# Patient Record
Sex: MALE | Race: WHITE | NOT HISPANIC OR LATINO | Employment: FULL TIME | URBAN - METROPOLITAN AREA
[De-identification: names, ages, dates, MRNs, and addresses within clinical notes are randomized per-mention and may not be internally consistent; named-entity substitution may affect disease eponyms.]

---

## 2023-04-05 ENCOUNTER — OFFICE VISIT (OUTPATIENT)
Dept: PRIMARY CARE | Facility: CLINIC | Age: 53
End: 2023-04-05
Payer: COMMERCIAL

## 2023-04-05 VITALS
DIASTOLIC BLOOD PRESSURE: 101 MMHG | OXYGEN SATURATION: 96 % | SYSTOLIC BLOOD PRESSURE: 186 MMHG | WEIGHT: 163.25 LBS | HEART RATE: 66 BPM | BODY MASS INDEX: 24.74 KG/M2 | HEIGHT: 68 IN

## 2023-04-05 DIAGNOSIS — R22.32 SUBCUTANEOUS NODULE OF LEFT HAND: ICD-10-CM

## 2023-04-05 DIAGNOSIS — I10 HYPERTENSION, UNSPECIFIED TYPE: Primary | ICD-10-CM

## 2023-04-05 DIAGNOSIS — R94.31 ABNORMAL EKG: ICD-10-CM

## 2023-04-05 DIAGNOSIS — Z12.5 SCREENING FOR PROSTATE CANCER: ICD-10-CM

## 2023-04-05 LAB
POC ALBUMIN /CREATININE RATIO MANUALLY ENTERED: <30 UG/MG CREAT
POC URINE ALBUMIN: 10 MG/L
POC URINE CREATININE: 200 MG/DL

## 2023-04-05 PROCEDURE — 82044 UR ALBUMIN SEMIQUANTITATIVE: CPT | Performed by: FAMILY MEDICINE

## 2023-04-05 PROCEDURE — 1036F TOBACCO NON-USER: CPT | Performed by: FAMILY MEDICINE

## 2023-04-05 PROCEDURE — 3077F SYST BP >= 140 MM HG: CPT | Performed by: FAMILY MEDICINE

## 2023-04-05 PROCEDURE — 93000 ELECTROCARDIOGRAM COMPLETE: CPT | Performed by: FAMILY MEDICINE

## 2023-04-05 PROCEDURE — 99203 OFFICE O/P NEW LOW 30 MIN: CPT | Performed by: FAMILY MEDICINE

## 2023-04-05 PROCEDURE — 3080F DIAST BP >= 90 MM HG: CPT | Performed by: FAMILY MEDICINE

## 2023-04-05 RX ORDER — BENAZEPRIL HYDROCHLORIDE 10 MG/1
10 TABLET ORAL DAILY
Qty: 30 TABLET | Refills: 2 | Status: SHIPPED | OUTPATIENT
Start: 2023-04-05 | End: 2023-05-02 | Stop reason: SDUPTHER

## 2023-04-05 ASSESSMENT — ENCOUNTER SYMPTOMS
NAUSEA: 0
UNEXPECTED WEIGHT CHANGE: 0
TROUBLE SWALLOWING: 0
DIFFICULTY URINATING: 0
VOMITING: 0
NUMBNESS: 0
WHEEZING: 0
CONFUSION: 0
WEAKNESS: 0
SHORTNESS OF BREATH: 0
DIZZINESS: 0
COUGH: 0
DIARRHEA: 0
LIGHT-HEADEDNESS: 0
FEVER: 0
BLOOD IN STOOL: 0
DYSURIA: 0
CHILLS: 0
ABDOMINAL PAIN: 0

## 2023-04-05 NOTE — PROGRESS NOTES
Subjective   Patient ID: Estevan Hernandez is a 53 y.o. male who presents for Establish Care (Pt presents as new pt , concerned of htn, no meds.BL).  HPI  C/o hypertension. Home BP cuff roughly the same. Not routinely checking his BP. Used to take Amlodipine 10 mg, seemed to cause some lightheadedness, switched to Amlodipine-Benazepril 5-20 mg. He does not recall this causing any problem.    Runs 5 miles a day for many years. FMHx grandparents unknown heart disease.    Denies any symptoms.    C/o lump in hand. Noticed first a couple weeks ago, when using a wrench. No change. Not painful.    Review of Systems   Constitutional:  Negative for chills, fever and unexpected weight change.   HENT:  Negative for ear pain and trouble swallowing.    Respiratory:  Negative for cough, shortness of breath and wheezing.    Cardiovascular:  Negative for chest pain.   Gastrointestinal:  Negative for abdominal pain, blood in stool, diarrhea, nausea and vomiting.   Genitourinary:  Negative for difficulty urinating and dysuria.   Skin:  Negative for rash.   Neurological:  Negative for dizziness, syncope, weakness, light-headedness and numbness.   Psychiatric/Behavioral:  Negative for behavioral problems and confusion.        Objective   Physical Exam  Vitals and nursing note reviewed.   Constitutional:       General: He is not in acute distress.     Appearance: He is not diaphoretic.   HENT:      Head: Normocephalic and atraumatic.   Eyes:      General: No scleral icterus.     Conjunctiva/sclera: Conjunctivae normal.   Neck:      Vascular: No carotid bruit.      Comments: Normal thyroid.  Cardiovascular:      Rate and Rhythm: Normal rate and regular rhythm.      Heart sounds: Normal heart sounds.   Pulmonary:      Effort: Pulmonary effort is normal.      Breath sounds: Normal breath sounds. No wheezing, rhonchi or rales.   Musculoskeletal:         General: Deformity (left hand subcutaneous elongated firm nodule palmar aspect)  present.      Cervical back: Normal range of motion and neck supple. No rigidity or tenderness.      Right lower leg: No edema.      Left lower leg: No edema.   Lymphadenopathy:      Cervical: No cervical adenopathy.   Skin:     General: Skin is warm and dry.   Neurological:      General: No focal deficit present.      Mental Status: He is alert. Mental status is at baseline.   Psychiatric:         Mood and Affect: Mood normal.         Thought Content: Thought content normal.         Assessment/Plan   Diagnoses and all orders for this visit:  Hypertension, unspecified type  Comments:  (Re)start Benazepril. Recheck in 1 month. Check fasting labs, and EKG.  Orders:  -     benazepril (Lotensin) 10 mg tablet; Take 1 tablet (10 mg) by mouth once daily.  -     Comprehensive Metabolic Panel; Future  -     CBC; Future  -     Lipid Panel; Future  -     TSH with reflex to Free T4 if abnormal; Future  -     Magnesium; Future  -     POCT microalbumin manually resulted  -     ECG 12 lead (Clinic Performed)  -     Transthoracic echo (TTE) complete; Future  Subcutaneous nodule of left hand  Comments:  Suspect ganglion cyst. Consult hand surgery.  Orders:  -     Referral to Orthopaedic Surgery; Future  Screening for prostate cancer  -     Prostate Specific Antigen, Screen; Future  Abnormal EKG  Comments:  Probably LVH. Check echocardiogram.  Orders:  -     Transthoracic echo (TTE) complete; Future  Other orders  -     Follow Up In Primary Care; Future

## 2023-05-02 ENCOUNTER — OFFICE VISIT (OUTPATIENT)
Dept: PRIMARY CARE | Facility: CLINIC | Age: 53
End: 2023-05-02
Payer: COMMERCIAL

## 2023-05-02 ENCOUNTER — LAB (OUTPATIENT)
Dept: LAB | Facility: LAB | Age: 53
End: 2023-05-02
Payer: COMMERCIAL

## 2023-05-02 VITALS
WEIGHT: 163.13 LBS | DIASTOLIC BLOOD PRESSURE: 85 MMHG | SYSTOLIC BLOOD PRESSURE: 164 MMHG | OXYGEN SATURATION: 96 % | HEIGHT: 68 IN | BODY MASS INDEX: 24.72 KG/M2 | HEART RATE: 57 BPM

## 2023-05-02 DIAGNOSIS — I10 HYPERTENSION, UNSPECIFIED TYPE: ICD-10-CM

## 2023-05-02 DIAGNOSIS — I10 HYPERTENSION, UNSPECIFIED TYPE: Primary | ICD-10-CM

## 2023-05-02 DIAGNOSIS — Z12.5 SCREENING FOR PROSTATE CANCER: ICD-10-CM

## 2023-05-02 LAB
ALANINE AMINOTRANSFERASE (SGPT) (U/L) IN SER/PLAS: 19 U/L (ref 10–52)
ALBUMIN (G/DL) IN SER/PLAS: 4.1 G/DL (ref 3.4–5)
ALKALINE PHOSPHATASE (U/L) IN SER/PLAS: 81 U/L (ref 33–120)
ANION GAP IN SER/PLAS: 14 MMOL/L (ref 10–20)
ASPARTATE AMINOTRANSFERASE (SGOT) (U/L) IN SER/PLAS: 23 U/L (ref 9–39)
BILIRUBIN TOTAL (MG/DL) IN SER/PLAS: 0.7 MG/DL (ref 0–1.2)
CALCIUM (MG/DL) IN SER/PLAS: 9.4 MG/DL (ref 8.6–10.3)
CARBON DIOXIDE, TOTAL (MMOL/L) IN SER/PLAS: 29 MMOL/L (ref 21–32)
CHLORIDE (MMOL/L) IN SER/PLAS: 102 MMOL/L (ref 98–107)
CHOLESTEROL (MG/DL) IN SER/PLAS: 167 MG/DL (ref 0–199)
CHOLESTEROL IN HDL (MG/DL) IN SER/PLAS: 66.2 MG/DL
CHOLESTEROL/HDL RATIO: 2.5
CREATININE (MG/DL) IN SER/PLAS: 0.78 MG/DL (ref 0.5–1.3)
ERYTHROCYTE DISTRIBUTION WIDTH (RATIO) BY AUTOMATED COUNT: 12.3 % (ref 11.5–14.5)
ERYTHROCYTE MEAN CORPUSCULAR HEMOGLOBIN CONCENTRATION (G/DL) BY AUTOMATED: 33.4 G/DL (ref 32–36)
ERYTHROCYTE MEAN CORPUSCULAR VOLUME (FL) BY AUTOMATED COUNT: 90 FL (ref 80–100)
ERYTHROCYTES (10*6/UL) IN BLOOD BY AUTOMATED COUNT: 5.19 X10E12/L (ref 4.5–5.9)
GFR MALE: >90 ML/MIN/1.73M2
GLUCOSE (MG/DL) IN SER/PLAS: 71 MG/DL (ref 74–99)
HEMATOCRIT (%) IN BLOOD BY AUTOMATED COUNT: 46.7 % (ref 41–52)
HEMOGLOBIN (G/DL) IN BLOOD: 15.6 G/DL (ref 13.5–17.5)
LDL: 86 MG/DL (ref 0–99)
LEUKOCYTES (10*3/UL) IN BLOOD BY AUTOMATED COUNT: 8.6 X10E9/L (ref 4.4–11.3)
MAGNESIUM (MG/DL) IN SER/PLAS: 2.08 MG/DL (ref 1.6–2.4)
PLATELETS (10*3/UL) IN BLOOD AUTOMATED COUNT: 278 X10E9/L (ref 150–450)
POTASSIUM (MMOL/L) IN SER/PLAS: 4.7 MMOL/L (ref 3.5–5.3)
PROSTATE SPECIFIC ANTIGEN,SCREEN: 1.02 NG/ML (ref 0–4)
PROTEIN TOTAL: 6.4 G/DL (ref 6.4–8.2)
SODIUM (MMOL/L) IN SER/PLAS: 140 MMOL/L (ref 136–145)
THYROTROPIN (MIU/L) IN SER/PLAS BY DETECTION LIMIT <= 0.05 MIU/L: 0.77 MIU/L (ref 0.44–3.98)
TRIGLYCERIDE (MG/DL) IN SER/PLAS: 75 MG/DL (ref 0–149)
UREA NITROGEN (MG/DL) IN SER/PLAS: 18 MG/DL (ref 6–23)
VLDL: 15 MG/DL (ref 0–40)

## 2023-05-02 PROCEDURE — 36415 COLL VENOUS BLD VENIPUNCTURE: CPT

## 2023-05-02 PROCEDURE — 80061 LIPID PANEL: CPT

## 2023-05-02 PROCEDURE — 80053 COMPREHEN METABOLIC PANEL: CPT

## 2023-05-02 PROCEDURE — 84153 ASSAY OF PSA TOTAL: CPT

## 2023-05-02 PROCEDURE — 85027 COMPLETE CBC AUTOMATED: CPT

## 2023-05-02 PROCEDURE — 84443 ASSAY THYROID STIM HORMONE: CPT

## 2023-05-02 PROCEDURE — 1036F TOBACCO NON-USER: CPT | Performed by: FAMILY MEDICINE

## 2023-05-02 PROCEDURE — 3077F SYST BP >= 140 MM HG: CPT | Performed by: FAMILY MEDICINE

## 2023-05-02 PROCEDURE — 99214 OFFICE O/P EST MOD 30 MIN: CPT | Performed by: FAMILY MEDICINE

## 2023-05-02 PROCEDURE — 83735 ASSAY OF MAGNESIUM: CPT

## 2023-05-02 PROCEDURE — 3079F DIAST BP 80-89 MM HG: CPT | Performed by: FAMILY MEDICINE

## 2023-05-02 RX ORDER — BENAZEPRIL HYDROCHLORIDE 20 MG/1
20 TABLET ORAL DAILY
Qty: 30 TABLET | Refills: 3 | Status: SHIPPED | OUTPATIENT
Start: 2023-05-02 | End: 2023-05-26

## 2023-05-02 ASSESSMENT — ENCOUNTER SYMPTOMS
DYSURIA: 0
NAUSEA: 0
BLOOD IN STOOL: 0
COUGH: 0
ABDOMINAL PAIN: 0
FEVER: 0
UNEXPECTED WEIGHT CHANGE: 0
LIGHT-HEADEDNESS: 0
CHILLS: 0
DIFFICULTY URINATING: 0
DIZZINESS: 0
CONFUSION: 0
WEAKNESS: 0
SHORTNESS OF BREATH: 0
DIARRHEA: 0
WHEEZING: 0
VOMITING: 0
NUMBNESS: 0
TROUBLE SWALLOWING: 0

## 2023-05-02 NOTE — PATIENT INSTRUCTIONS
Monitor your blood pressure (BP) and heart rate (HR) at home.    Please return for a follow-up appointment in 3 months, earlier if any question or concern.    For assistance with scheduling referrals or consultations, please call [phone]. For laboratory, radiology, and other tests, please call [phone] ([phone] for pediatrics). Please review prescription inserts and published information for possible adverse effects. Return after testing or consultation to review results and recommendations, if symptoms persist, change, worsen, or return, or if you have any question or concern. For non-emergencies, you may call the office. For emergencies, call 9-1-1 or go to the nearest Emergency Department. Please schedule additional appointment(s) to address concern(s) not addressed today.    In general, results will not be released or discussed over the telephone. Results of tests done through [facility] are released via  "[employer]:  [email or web address]    Until we complete our transition to the new system, additional information can be found at [email or web address] or on your Android or iOS (iPhone, iPad) device using the [employer]. bertha available free of charge in your device's bertha store.

## 2023-05-02 NOTE — ASSESSMENT & PLAN NOTE
Improved, but not < 140 SBP. Increase Benazepril. Check fasting labs. Advised also other tests, as ordered. Return in 3 months.

## 2023-05-02 NOTE — PROGRESS NOTES
"Subjective   Patient ID: Estevan Hernandez is a 53 y.o. male who presents for Hypertension (Pt presents for htn follow up states not had any testing done, but has been taking meds and monitoring at home, states low 130's to low 150's.BL).  HPI  Reports high 130s SBP to 150s/80s.    Feeling pretty good. Denies any symptoms. Tolerating Benazepril well, without side effects.    Review of Systems   Constitutional:  Negative for chills, fever and unexpected weight change.   HENT:  Negative for ear pain and trouble swallowing.    Respiratory:  Negative for cough, shortness of breath and wheezing.    Cardiovascular:  Negative for chest pain.   Gastrointestinal:  Negative for abdominal pain, blood in stool, diarrhea, nausea and vomiting.   Genitourinary:  Negative for difficulty urinating and dysuria.   Skin:  Negative for rash.   Neurological:  Negative for dizziness, syncope, weakness, light-headedness and numbness.   Psychiatric/Behavioral:  Negative for behavioral problems and confusion.          Objective   /85   Pulse 57   Ht 1.727 m (5' 8\")   Wt 74 kg (163 lb 2 oz)   SpO2 96%   BMI 24.80 kg/m²     Physical Exam  Vitals and nursing note reviewed.   Constitutional:       General: He is not in acute distress.     Appearance: He is not diaphoretic.   HENT:      Head: Normocephalic and atraumatic.   Eyes:      General: No scleral icterus.     Conjunctiva/sclera: Conjunctivae normal.   Cardiovascular:      Rate and Rhythm: Normal rate and regular rhythm.      Heart sounds: Normal heart sounds.   Pulmonary:      Effort: Pulmonary effort is normal.      Breath sounds: Normal breath sounds. No wheezing, rhonchi or rales.   Skin:     General: Skin is warm and dry.   Neurological:      General: No focal deficit present.      Mental Status: He is alert. Mental status is at baseline.   Psychiatric:         Mood and Affect: Mood normal.         Thought Content: Thought content normal.         Assessment/Plan "   Problem List Items Addressed This Visit          Circulatory    Hypertension - Primary     Improved, but not < 140 SBP. Increase Benazepril. Check fasting labs. Advised also other tests, as ordered. Return in 3 months.         Relevant Medications    benazepril (Lotensin) 20 mg tablet

## 2023-05-11 ENCOUNTER — TELEPHONE (OUTPATIENT)
Dept: PRIMARY CARE | Facility: CLINIC | Age: 53
End: 2023-05-11
Payer: COMMERCIAL

## 2023-05-11 NOTE — TELEPHONE ENCOUNTER
Result Communication    Resulted Orders   Comprehensive Metabolic Panel   Result Value Ref Range    Glucose 71 (L) 74 - 99 mg/dL    Sodium 140 136 - 145 mmol/L    Potassium 4.7 3.5 - 5.3 mmol/L    Chloride 102 98 - 107 mmol/L    Bicarbonate 29 21 - 32 mmol/L    Anion Gap 14 10 - 20 mmol/L    Urea Nitrogen 18 6 - 23 mg/dL    Creatinine 0.78 0.50 - 1.30 mg/dL    GFR MALE >90 >90 mL/min/1.73m2      Comment:       CALCULATIONS OF ESTIMATED GFR ARE PERFORMED   USING THE 2021 CKD-EPI STUDY REFIT EQUATION   WITHOUT THE RACE VARIABLE FOR THE IDMS-TRACEABLE   CREATININE METHODS.    https://jasn.asnjournals.org/content/early/2021/09/22/ASN.[email or web address]    Calcium 9.4 8.6 - 10.3 mg/dL    Albumin 4.1 3.4 - 5.0 g/dL    Alkaline Phosphatase 81 33 - 120 U/L    Total Protein 6.4 6.4 - 8.2 g/dL    AST 23 9 - 39 U/L    Total Bilirubin 0.7 0.0 - 1.2 mg/dL    ALT (SGPT) 19 10 - 52 U/L      Comment:       Patients treated with Sulfasalazine may generate    falsely decreased results for ALT.   CBC   Result Value Ref Range    WBC 8.6 4.4 - 11.3 x10E9/L    RBC 5.19 4.50 - 5.90 x10E12/L    Hemoglobin 15.6 13.5 - 17.5 g/dL    Hematocrit 46.7 41.0 - 52.0 %    MCV 90 80 - 100 fL    MCHC 33.4 32.0 - 36.0 g/dL    Platelets 278 150 - 450 x10E9/L    RDW 12.3 11.5 - 14.5 %   Lipid Panel   Result Value Ref Range    Cholesterol 167 0 - 199 mg/dL      Comment:      .      AGE      DESIRABLE   BORDERLINE HIGH   HIGH     0-19 Y     0 - 169       170 - 199     >/= 200    20-24 Y     0 - 189       190 - 224     >/= 225         >24 Y     0 - 199       200 - 239     >/= 240   **All ranges are based on fasting samples. Specific   therapeutic targets will vary based on patient-specific   cardiac risk.  .   Pediatric guidelines reference:Pediatrics 2011, 128(S5).   Adult guidelines reference: NCEP ATPIII Guidelines,     GEOVANY 2001, 258:2486-97  .   Venipuncture immediately after or during the    administration of Metamizole may lead to falsely   low results.  Testing should be performed immediately   prior to Metamizole dosing.    HDL 66.2 mg/dL      Comment:      .      AGE      VERY LOW   LOW     NORMAL    HIGH       0-19 Y       < 35   < 40     40-45     ----    20-24 Y       ----   < 40       >45     ----      >24 Y       ----   < 40     40-60      >60  .    Cholesterol/HDL Ratio 2.5       Comment:      REF VALUES  DESIRABLE  < 3.4  HIGH RISK  > 5.0    LDL 86 0 - 99 mg/dL      Comment:      .                           NEAR      BORD      AGE      DESIRABLE  OPTIMAL    HIGH     HIGH     VERY HIGH     0-19 Y     0 - 109     ---    110-129   >/= 130     ----    20-24 Y     0 - 119     ---    120-159   >/= 160     ----      >24 Y     0 -  99   100-129  130-159   160-189     >/=190  .    VLDL 15 0 - 40 mg/dL    Triglycerides 75 0 - 149 mg/dL      Comment:      .      AGE      DESIRABLE   BORDERLINE HIGH   HIGH     VERY HIGH   0 D-90 D    19 - 174         ----         ----        ----  91 D- 9 Y     0 -  74        75 -  99     >/= 100      ----    10-19 Y     0 -  89        90 - 129     >/= 130      ----    20-24 Y     0 - 114       115 - 149     >/= 150      ----         >24 Y     0 - 149       150 - 199    200- 499    >/= 500  .   Venipuncture immediately after or during the    administration of Metamizole may lead to falsely   low results. Testing should be performed immediately   prior to Metamizole dosing.   Prostate Specific Antigen, Screen   Result Value Ref Range    Prostate Specific Antigen,Screen 1.02 0.00 - 4.00 ng/mL      Comment:      The FDA requires that the method used for PSA assay be   reported to the physician. Values obtained with different   assay methods must not be used interchangeably. This test   was performed at [facility] using the Siemens  MetagollGetPrice PSA method, which is a sandwich immunoassay using   chemiluminescence for quantitation. The assay is approved  for measurement of prostate-specific antigen (PSA) in   serum and may be  used in conjunction with a digital rectal  examination in men 50 years and older as an aid in   detection of prostate cancer.   5-Alpha-reductase inhibitors (e.g. Proscar, Finasteride,   Avodart, Dutasteride and Kasey) for the treatment of BPH   have been shown to lower PSA levels by an average of 50%   after 6 months of treatment.   TSH with reflex to Free T4 if abnormal   Result Value Ref Range    TSH 0.77 0.44 - 3.98 mIU/L      Comment:       TSH testing is performed using different testing    methodology at [facility] than at other    [facility]. Direct result comparisons should    only be made within the same method.   Magnesium   Result Value Ref Range    Magnesium 2.08 1.60 - 2.40 mg/dL       12:12 PM      Results were successfully communicated with the patient and they acknowledged their understanding. Spouse was informed of lab results.BL

## 2023-05-22 ENCOUNTER — TELEPHONE (OUTPATIENT)
Dept: PRIMARY CARE | Facility: CLINIC | Age: 53
End: 2023-05-22
Payer: COMMERCIAL

## 2023-05-22 NOTE — TELEPHONE ENCOUNTER
----- Message from Jay Gregg DO sent at 5/21/2023  1:44 PM EDT -----  Please let patient know that his echocardiogram report describes trace to mild tricuspid valve regurgitation, trace mitral valve regurgitation, and a normal left ventricular ejection fraction.  Overall, fairly normal, but recommend that he have an echocardiogram repeated in 3 to 5 years.

## 2023-05-22 NOTE — TELEPHONE ENCOUNTER
Result Communication    Resulted Orders   Echocardiogram    Narrative    [facility], [address] [place] [id number]  Tel [phone] and Fax [phone]    TRANSTHORACIC ECHOCARDIOGRAM REPORT      Patient Name:     MONSTER CARLSON        Toma Physician:  [id number] Nikolas GALVEZ MD  Study Date:       5/12/2023            Referring           MAGUI MANLEY  Physician:  MRN/PID:          [id number]             PCP:  Accession/Order#: [id number]         Department          [facility]  Location:  YOB: 1970            Fellow:  Gender:           M                    Nurse:  Admit Date:                            Sonographer:        Lizzeth Kan [facility]  Admission Status: Outpatient           Additional Staff:  Height:           175.26 cm            CC Report to:  Weight:           73.94 kg             Study Type:         Echocardiogram  BSA:              1.89 m2  Blood Pressure: 164 /85 mmHg    Diagnosis/ICD: I10-Essential (primary) hypertension; R94.31-Abnormal  electrocardiogram [ECG] [EKG]  Indication:    HTN; Abnormal EKG  Procedure/CPT: Echo Complete w Full [id number]    Patient History:  Pertinent History: HTN.    Study Detail: The following Echo studies were performed: 2D, M-Mode, Doppler and  color flow.      PHYSICIAN INTERPRETATION:  Left Ventricle: The left ventricular systolic function is normal, with an estimated ejection fraction of 55-60%. The calculated ejection fraction is borderline at 53 % using the Kathleen's Bi-plane MOD calculation. There are no regional wall motion abnormalities. The left ventricular cavity size is normal. Spectral Doppler shows a normal pattern of left ventricular diastolic filling.  Left Atrium: The left atrium is normal in size.  Right Ventricle: The right ventricle is normal in size. There is normal right ventriclar wall thickness. There is normal right ventricular global systolic  function.  Right Atrium: The right atrium is normal in size.  Aortic Valve: The aortic valve appears structurally normal. The aortic valve appears tricuspid and non-restricted. There is no evidence of aortic valve regurgitation. The peak instantaneous gradient of the aortic valve is 16.3 mmHg.  Mitral Valve: The mitral valve is normal in structure. There is normal mitral valve leaflet mobility. There is trace mitral valve regurgitation.  Tricuspid Valve: The tricuspid valve is structurally normal. There is normal tricuspid valve leaflet mobility. There is trace to mild tricuspid regurgitation.  Pulmonic Valve: The pulmonic valve is structurally normal. There is trace pulmonic valve regurgitation.  Pericardium: There is no pericardial effusion noted. There is a pericardial fat pad present.  Aorta: The aortic root is normal. The Ao Sinus is 3.10 cm. The Asc Ao is 3.30 cm. There is no dilatation of the aortic arch. There is no dilatation of the ascending aorta. There is no dilatation of the aortic root.  Pulmonary Artery: The pulmonary artery is normal in size. The tricuspid regurgitant velocity is 1.77 m/s, and with an estimated right atrial pressure of 3 mmHg, the estimated pulmonary artery pressure is normal with the RVSP at 15.5 mmHg.  Systemic Veins: The inferior vena cava appears to be of normal size. There is IVC inspiratory collapse greater than 50%.      CONCLUSIONS:  1. Left ventricular systolic function is normal with a 55-60% estimated ejection fraction.  2. Trace mitral valve regurgitation.  3. Trace to mild tricuspid regurgitation visualized.    QUANTITATIVE DATA SUMMARY:  2D MEASUREMENTS:  Normal Ranges:  Ao Root d:     3.10 cm   (2.0-3.7cm)  LAs:           3.20 cm   (2.7-4.0cm)  IVSd:          1.00 cm   (0.6-1.1cm)  LVPWd:         1.00 cm   (0.6-1.1cm)  LVIDd:         4.80 cm   (3.9-5.9cm)  LVIDs:         3.70 cm  LV Mass Index: 89.9 g/m2  LV % FS        22.9 %    LA VOLUME:  Normal Ranges:  LA Vol A4C:         72.0 ml    (22+/-6mL/m2)  LA Vol A2C:        72.5 ml  LA Vol BP:         72.3 ml  LA Vol Index A4C:  38.0ml/m2  LA Vol Index A2C:  38.3 ml/m2  LA Vol Index BP:   38.2 ml/m2  LA Area A4C:       21.6 cm2  LA Area A2C:       21.7 cm2  LA Major Axis A4C: 5.5 cm  LA Major Axis A2C: 5.5 cm  LA Volume Index:   35.4 ml/m2  LA Vol A4C:        66.0 ml  LA Vol A2C:        69.0 ml    M-MODE MEASUREMENTS:  Normal Ranges:  Ao Root: 3.50 cm (2.0-3.7cm)    AORTA MEASUREMENTS:  Normal Ranges:  Ao Sinus, d: 3.10 cm (2.1-3.5cm)  Asc Ao, d:   3.30 cm (2.1-3.4cm)    LV SYSTOLIC FUNCTION BY 2D PLANIMETRY (MOD):  Normal Ranges:  EF-A4C View: 53.4 % (>=55%)  EF-A2C View: 54.5 %  EF-Biplane:  52.9 %    LV DIASTOLIC FUNCTION:  Normal Ranges:  MV Peak E:        0.74 m/s    (0.7-1.2 m/s)  MV Peak A:        0.58 m/s    (0.42-0.7 m/s)  E/A Ratio:        1.27        (1.0-2.2)  MV lateral e'     0.12 m/s  MV medial e'      0.07 m/s  MV A Dur:         161.00 msec  E/e' Ratio:       5.90        (<8.0)  PulmV Sys Gerhard:    51.10 cm/s  PulmV Demarco Gerhard:   38.50 cm/s  PulmV S/D Gerhard:    1.30  PulmV A Revs Gerhard: 26.40 cm/s  PulmV A Revs Dur: 74.00 msec    MITRAL VALVE:  Normal Ranges:  MV Vmax:    0.78 m/s (<=1.3m/s)  MV peak P.4 mmHg (<5mmHg)  MV mean P.0 mmHg (<2mmHg)  MV DT:      251 msec (150-240msec)    AORTIC VALVE:  Normal Ranges:  AoV Vmax:      2.02 m/s  (<=1.7m/s)  AoV Peak P.3 mmHg (<20mmHg)  LVOT Max Gerhard:  1.39 m/s  (<=1.1m/s)  LVOT VTI:      27.10 cm  LVOT Diameter: 2.10 cm   (1.8-2.4cm)  AoV Area,Vmax: 2.38 cm2  (2.5-4.5cm2)    RIGHT VENTRICLE:  RV 1 4.01 cm  RV 2 2.79 cm  RV 3 9.49 cm    TRICUSPID VALVE/RVSP:  Normal Ranges:  Peak TR Velocity: 1.77 m/s  Est. RA Pressure: 3 mmHg  RV Syst Pressure: 15.5 mmHg (< 30mmHg)  IVC Diam:         1.85 cm    PULMONIC VALVE:  Normal Ranges:  PV Max Gerhard: 1.0 m/s  (0.6-0.9m/s)  PV Max P.4 mmHg  PIEDV:      0.91 m/s  PADP:       6.3 mmHg    Pulmonary Veins:  PulmV A Revs Dur: 74.00  msec  PulmV A Revs Gerhard: 26.40 cm/s  PulmV Demarco Gerhard:   38.50 cm/s  PulmV S/D Gerhard:    1.30  PulmV Sys Gerhard:    51.10 cm/s      [id number] Nikolas Trujillo MD  Electronically signed on 5/13/2023 at 10:11:48 PM         Final          9:39 AM      Results were successfully communicated with the spouse and they acknowledged their understanding.

## 2023-05-24 DIAGNOSIS — I10 HYPERTENSION, UNSPECIFIED TYPE: ICD-10-CM

## 2023-05-26 RX ORDER — BENAZEPRIL HYDROCHLORIDE 20 MG/1
TABLET ORAL
Qty: 30 TABLET | Refills: 3 | Status: SHIPPED | OUTPATIENT
Start: 2023-05-26 | End: 2023-09-25 | Stop reason: SDUPTHER

## 2023-09-25 ENCOUNTER — OFFICE VISIT (OUTPATIENT)
Dept: PRIMARY CARE | Facility: CLINIC | Age: 53
End: 2023-09-25
Payer: COMMERCIAL

## 2023-09-25 VITALS
HEART RATE: 61 BPM | OXYGEN SATURATION: 97 % | BODY MASS INDEX: 22.99 KG/M2 | HEIGHT: 69 IN | DIASTOLIC BLOOD PRESSURE: 88 MMHG | SYSTOLIC BLOOD PRESSURE: 144 MMHG | WEIGHT: 155.25 LBS

## 2023-09-25 DIAGNOSIS — I10 HYPERTENSION, UNSPECIFIED TYPE: ICD-10-CM

## 2023-09-25 DIAGNOSIS — I10 HYPERTENSION, UNSPECIFIED TYPE: Primary | ICD-10-CM

## 2023-09-25 DIAGNOSIS — Z12.5 SCREENING FOR PROSTATE CANCER: ICD-10-CM

## 2023-09-25 DIAGNOSIS — E16.2 HYPOGLYCEMIA: ICD-10-CM

## 2023-09-25 PROBLEM — D17.1 LIPOMA OF TORSO: Status: ACTIVE | Noted: 2023-09-25

## 2023-09-25 PROCEDURE — 3079F DIAST BP 80-89 MM HG: CPT | Performed by: FAMILY MEDICINE

## 2023-09-25 PROCEDURE — 1036F TOBACCO NON-USER: CPT | Performed by: FAMILY MEDICINE

## 2023-09-25 PROCEDURE — 3077F SYST BP >= 140 MM HG: CPT | Performed by: FAMILY MEDICINE

## 2023-09-25 PROCEDURE — 99214 OFFICE O/P EST MOD 30 MIN: CPT | Performed by: FAMILY MEDICINE

## 2023-09-25 RX ORDER — BENAZEPRIL HYDROCHLORIDE 20 MG/1
TABLET ORAL
Qty: 30 TABLET | Refills: 3 | OUTPATIENT
Start: 2023-09-25

## 2023-09-25 RX ORDER — BENAZEPRIL HYDROCHLORIDE 20 MG/1
30 TABLET ORAL DAILY
Qty: 135 TABLET | Refills: 1 | Status: SHIPPED | OUTPATIENT
Start: 2023-09-25 | End: 2024-03-21

## 2023-09-25 RX ORDER — AMLODIPINE AND BENAZEPRIL HYDROCHLORIDE 5; 20 MG/1; MG/1
1 CAPSULE ORAL DAILY
COMMUNITY
Start: 2018-06-19 | End: 2023-09-25 | Stop reason: WASHOUT

## 2023-09-25 ASSESSMENT — ENCOUNTER SYMPTOMS
NUMBNESS: 0
COUGH: 0
UNEXPECTED WEIGHT CHANGE: 0
NAUSEA: 0
TROUBLE SWALLOWING: 0
DIZZINESS: 0
VOMITING: 0
WHEEZING: 0
DYSURIA: 0
LOSS OF SENSATION IN FEET: 0
CONFUSION: 0
DEPRESSION: 0
FEVER: 0
WEAKNESS: 0
DIFFICULTY URINATING: 0
OCCASIONAL FEELINGS OF UNSTEADINESS: 0
LIGHT-HEADEDNESS: 0
ABDOMINAL PAIN: 0
DIARRHEA: 0
SHORTNESS OF BREATH: 0
CHILLS: 0
BLOOD IN STOOL: 0

## 2023-09-25 ASSESSMENT — PATIENT HEALTH QUESTIONNAIRE - PHQ9
2. FEELING DOWN, DEPRESSED OR HOPELESS: NOT AT ALL
1. LITTLE INTEREST OR PLEASURE IN DOING THINGS: NOT AT ALL
SUM OF ALL RESPONSES TO PHQ9 QUESTIONS 1 AND 2: 0

## 2023-09-25 NOTE — PATIENT INSTRUCTIONS
Monitor your resting blood pressure (BP) and heart rate (HR) at home. Goal < 140/90, but < 130/80 may be preferable in the long run.    Please return for a follow-up appointment and Wellness visit in 6 months, after labs, earlier if any question or concern, or BP not at goal.      For assistance with scheduling referrals or consultations, please call [phone]. For laboratory, radiology, and other tests, please call [phone] ([phone] for pediatrics). Please review prescription inserts and published information for possible adverse effects of all medications. Return after testing or consultation to review results and recommendations, if symptoms persist, change, worsen, or return, or if you have any question or concern. If you do not get results within 7-10 days, or you have any question or concern, please send a message, call the office ([phone]), or return to the office for a follow-up appointment. For non-emergencies, you may call the office. For emergencies, call 9-1-1 or go to the nearest Emergency Department. Please schedule additional appointment(s) to address concern(s) not addressed today.    In general, results are not released or discussed over the telephone. Results of tests done through [facility] are released via  [employer] (see below).  [email or web address]   [employer] support line: [phone]    Until we complete our transition to the new system, additional information can be found at [email or web address] or on your Android or iOS (iPhone, iPad) device using the [employer] bertha available free of charge in your device's bertha store.

## 2023-09-25 NOTE — PROGRESS NOTES
"Subjective   Patient ID: Estevan Hernandez is a 53 y.o. male who presents for Follow-up (Pt presents for check up, no concerns, refills are needed.BL).  HPI    Home BP 130s/80s. Feeling pretty good. Reports he did compare his BP cuff here, and was pretty much spot on.    Denies ever feeling weak, shaky, faint, dizzy, lightheaded, even when fasting.      Review of Systems   Constitutional:  Negative for chills, fever and unexpected weight change.   HENT:  Negative for ear pain and trouble swallowing.    Respiratory:  Negative for cough, shortness of breath and wheezing.    Cardiovascular:  Negative for chest pain.   Gastrointestinal:  Negative for abdominal pain, blood in stool, diarrhea, nausea and vomiting.   Genitourinary:  Negative for difficulty urinating and dysuria.   Skin:  Negative for rash.   Neurological:  Negative for dizziness, syncope, weakness, light-headedness and numbness.   Psychiatric/Behavioral:  Negative for behavioral problems and confusion.          Objective   /88   Pulse 61   Ht 1.74 m (5' 8.5\")   Wt 70.4 kg (155 lb 4 oz)   SpO2 97%   BMI 23.26 kg/m²     Physical Exam  Vitals and nursing note reviewed.   Constitutional:       General: He is not in acute distress.     Appearance: Normal appearance. He is not diaphoretic.   HENT:      Head: Normocephalic and atraumatic.   Eyes:      General: No scleral icterus.     Extraocular Movements: Extraocular movements intact.      Conjunctiva/sclera: Conjunctivae normal.   Cardiovascular:      Rate and Rhythm: Normal rate and regular rhythm.      Heart sounds: Normal heart sounds.   Pulmonary:      Effort: Pulmonary effort is normal. No respiratory distress.      Breath sounds: Normal breath sounds.   Abdominal:      General: Bowel sounds are normal. There is no distension.      Palpations: Abdomen is soft. There is no mass.      Tenderness: There is no abdominal tenderness. There is no guarding or rebound.   Musculoskeletal:      Right " lower leg: No edema.      Left lower leg: No edema.   Skin:     General: Skin is warm and dry.      Coloration: Skin is not jaundiced.   Neurological:      General: No focal deficit present.      Mental Status: He is alert and oriented to person, place, and time. Mental status is at baseline.   Psychiatric:         Mood and Affect: Mood normal.         Thought Content: Thought content normal.         Assessment/Plan   Problem List Items Addressed This Visit       Hypertension - Primary     Borderline control. Increase Benazepril to 30 mg. Monitor.          Relevant Medications    benazepril (Lotensin) 20 mg tablet    Other Relevant Orders    CBC    Comprehensive Metabolic Panel    Lipid Panel    Hypoglycemia     Very slight, and asymptomatic. Likely artifactual. Recheck with future routine labs.          Other Visit Diagnoses       Screening for prostate cancer        Relevant Orders    Prostate Specific Antigen, Screen        Declines flu shot.

## 2024-03-17 DIAGNOSIS — I10 HYPERTENSION, UNSPECIFIED TYPE: ICD-10-CM

## 2024-03-21 RX ORDER — BENAZEPRIL HYDROCHLORIDE 20 MG/1
30 TABLET ORAL DAILY
Qty: 45 TABLET | Refills: 0 | Status: SHIPPED | OUTPATIENT
Start: 2024-03-21 | End: 2024-04-08

## 2024-04-04 DIAGNOSIS — I10 HYPERTENSION, UNSPECIFIED TYPE: ICD-10-CM

## 2024-04-08 RX ORDER — BENAZEPRIL HYDROCHLORIDE 20 MG/1
30 TABLET ORAL DAILY
Qty: 135 TABLET | Refills: 1 | Status: SHIPPED | OUTPATIENT
Start: 2024-04-08

## 2024-07-23 ENCOUNTER — APPOINTMENT (OUTPATIENT)
Dept: PRIMARY CARE | Facility: CLINIC | Age: 54
End: 2024-07-23
Payer: COMMERCIAL

## 2024-07-23 VITALS
HEIGHT: 69 IN | SYSTOLIC BLOOD PRESSURE: 133 MMHG | HEART RATE: 52 BPM | WEIGHT: 159.13 LBS | BODY MASS INDEX: 23.57 KG/M2 | OXYGEN SATURATION: 96 % | DIASTOLIC BLOOD PRESSURE: 84 MMHG

## 2024-07-23 DIAGNOSIS — I10 HYPERTENSION, UNSPECIFIED TYPE: ICD-10-CM

## 2024-07-23 DIAGNOSIS — Z12.5 SCREENING FOR PROSTATE CANCER: Primary | ICD-10-CM

## 2024-07-23 PROCEDURE — 1036F TOBACCO NON-USER: CPT | Performed by: FAMILY MEDICINE

## 2024-07-23 PROCEDURE — 3075F SYST BP GE 130 - 139MM HG: CPT | Performed by: FAMILY MEDICINE

## 2024-07-23 PROCEDURE — 99213 OFFICE O/P EST LOW 20 MIN: CPT | Performed by: FAMILY MEDICINE

## 2024-07-23 PROCEDURE — 3079F DIAST BP 80-89 MM HG: CPT | Performed by: FAMILY MEDICINE

## 2024-07-23 PROCEDURE — 3008F BODY MASS INDEX DOCD: CPT | Performed by: FAMILY MEDICINE

## 2024-07-23 RX ORDER — BENAZEPRIL HYDROCHLORIDE 40 MG/1
40 TABLET ORAL DAILY
Qty: 100 TABLET | Refills: 1 | Status: SHIPPED | OUTPATIENT
Start: 2024-07-23

## 2024-07-23 ASSESSMENT — ENCOUNTER SYMPTOMS
CHILLS: 0
APNEA: 0
DEPRESSION: 0
DIAPHORESIS: 0
HEADACHES: 0
WHEEZING: 0
DIZZINESS: 0
LOSS OF SENSATION IN FEET: 0
COUGH: 0
SHORTNESS OF BREATH: 0
CHOKING: 0
LIGHT-HEADEDNESS: 0
PALPITATIONS: 0
UNEXPECTED WEIGHT CHANGE: 0
FEVER: 0
OCCASIONAL FEELINGS OF UNSTEADINESS: 0
CHEST TIGHTNESS: 0

## 2024-07-23 ASSESSMENT — PATIENT HEALTH QUESTIONNAIRE - PHQ9
SUM OF ALL RESPONSES TO PHQ9 QUESTIONS 1 AND 2: 0
1. LITTLE INTEREST OR PLEASURE IN DOING THINGS: NOT AT ALL
2. FEELING DOWN, DEPRESSED OR HOPELESS: NOT AT ALL

## 2024-07-23 NOTE — PROGRESS NOTES
"Subjective   Patient ID: Estevan Hernandez is a 54 y.o. male who presents for Hypertension (Pt presents fro htn check up, no concerns, rx's needed. Pt is requesting a 20mg and a 10mg of benazapril. BL).  HPI Historian(s): Self    Generally feeling well.    Reports BP at home 130s/80s.    Review of Systems   Constitutional:  Negative for chills, diaphoresis, fever and unexpected weight change.   Eyes:  Negative for visual disturbance.   Respiratory:  Negative for apnea (no PND), cough, choking, chest tightness, shortness of breath and wheezing.    Cardiovascular:  Negative for chest pain, palpitations and leg swelling.   Neurological:  Negative for dizziness, syncope, light-headedness and headaches.   All other systems reviewed and are negative.      Patient Care Team:  Jay Gregg DO as PCP - General (Family Medicine)  Jay Gregg DO [facility]CP [facility]  Juliana Rico MD    Objective   /84   Pulse 52   Ht 1.74 m (5' 8.5\")   Wt 72.2 kg (159 lb 2 oz)   SpO2 96%   BMI 23.84 kg/m²         Physical Exam  Vitals and nursing note reviewed.   Constitutional:       General: He is not in acute distress.     Appearance: Normal appearance. He is not diaphoretic.      Comments: No assistive device presently being used.   HENT:      Head: Normocephalic and atraumatic.   Eyes:      General: No scleral icterus.     Extraocular Movements: Extraocular movements intact.      Conjunctiva/sclera: Conjunctivae normal.   Cardiovascular:      Rate and Rhythm: Normal rate and regular rhythm.      Heart sounds: Normal heart sounds.   Pulmonary:      Effort: Pulmonary effort is normal. No respiratory distress.      Breath sounds: Normal breath sounds. No wheezing, rhonchi or rales.   Musculoskeletal:      Right lower leg: No edema.      Left lower leg: No edema.   Skin:     General: Skin is warm and dry.      Coloration: Skin is not jaundiced.   Neurological:      General: No focal deficit present. "      Mental Status: He is alert and oriented to person, place, and time. Mental status is at baseline.   Psychiatric:         Mood and Affect: Mood normal.         Behavior: Behavior normal.         Thought Content: Thought content normal.         Assessment & Plan  Hypertension, unspecified type  Borderline control. Discussed increasing Lotensin to improve control, risk of hypotension and organ hypoperfusion.    Orders:    benazepril (Lotensin) 40 mg tablet; Take 1 tablet (40 mg) by mouth once daily.    Basic Metabolic Panel; Future    Screening for prostate cancer    Orders:    Prostate Specific Antigen, Screen; Future

## 2024-07-23 NOTE — PATIENT INSTRUCTIONS
Watch out for symptoms of low blood pressure, e.g., faintness, dizziness, lightheadedness, loss of consciousness, excessive fatigue, blurry vision, confusion, decreased urine output, weakness.    Please have kidney numbness rechecked after you have been taking the increased dose of Benazepril for about 2 weeks.    Please return for a(n) blood pressure and medication follow-up appointment in 3 months, earlier if any question or concern. Please schedule additional problem-focused appointment(s) to address additional problem(s).    Monitor your resting blood pressure (BP) and heart rate (HR) at home. Resting BP should be fairly consistently better than 140/90, preferably better than 130/80, but not lower than 100/50. Please bring your BP cuff to your appointments. For a list of validated BP cuffs: https://www.validatebp.org/  Avoid taking Biotin (a vitamin, shows up particularly in hair/nail supplements) for a week prior to any blood tests, as it can interfere with certain results. Fasting for labs means 12 hours, nothing to eat or drink, except water and medications, unless directed otherwise.    For assistance with scheduling referrals or consultations, please call [phone]. For laboratory, radiology, and other tests, please call [phone] ([phone] for pediatrics). Please review prescription inserts and published information for possible adverse effects of all medications. Return after testing or consultation to review results and recommendations, if symptoms persist, change, worsen, or return, or if you have any question or concern. If you do not get results within 7-10 days, or you have any question or concern, please send a message, call the office ([phone]), or return to the office for a follow-up appointment. For non-emergencies, you may call the office. For emergencies, call 9-1-1 or go to the nearest Emergency Department. Please schedule additional appointment(s) to address concern(s) not  addressed today.    In general, results are not released or discussed over the telephone, but at an appointment or via  [employer]. Results of tests done through [facility] are released via  [employer] (see below).  [email or web address]   [employer] support line: [phone]

## 2024-07-23 NOTE — ASSESSMENT & PLAN NOTE
Borderline control. Discussed increasing Lotensin to improve control, risk of hypotension and organ hypoperfusion.    Orders:    benazepril (Lotensin) 40 mg tablet; Take 1 tablet (40 mg) by mouth once daily.    Basic Metabolic Panel; Future

## 2025-01-23 DIAGNOSIS — I10 HYPERTENSION, UNSPECIFIED TYPE: ICD-10-CM

## 2025-01-29 RX ORDER — BENAZEPRIL HYDROCHLORIDE 40 MG/1
40 TABLET ORAL DAILY
Qty: 30 TABLET | Refills: 6 | Status: SHIPPED | OUTPATIENT
Start: 2025-01-29

## 2025-06-03 ENCOUNTER — APPOINTMENT (OUTPATIENT)
Dept: PRIMARY CARE | Facility: CLINIC | Age: 55
End: 2025-06-03
Payer: COMMERCIAL

## 2025-07-23 DIAGNOSIS — I10 HYPERTENSION, UNSPECIFIED TYPE: ICD-10-CM

## 2025-08-02 RX ORDER — BENAZEPRIL HYDROCHLORIDE 40 MG/1
40 TABLET ORAL DAILY
Qty: 90 TABLET | Refills: 2 | Status: SHIPPED | OUTPATIENT
Start: 2025-08-02

## 2025-08-06 ENCOUNTER — APPOINTMENT (OUTPATIENT)
Dept: PRIMARY CARE | Facility: CLINIC | Age: 55
End: 2025-08-06
Payer: MEDICARE

## 2025-08-06 VITALS
DIASTOLIC BLOOD PRESSURE: 87 MMHG | WEIGHT: 155.8 LBS | SYSTOLIC BLOOD PRESSURE: 152 MMHG | HEART RATE: 48 BPM | OXYGEN SATURATION: 99 % | HEIGHT: 69 IN | BODY MASS INDEX: 23.08 KG/M2

## 2025-08-06 DIAGNOSIS — I10 PRIMARY HYPERTENSION: ICD-10-CM

## 2025-08-06 DIAGNOSIS — Z12.11 SCREENING FOR COLON CANCER: ICD-10-CM

## 2025-08-06 DIAGNOSIS — Z00.00 WELL ADULT HEALTH CHECK: Primary | ICD-10-CM

## 2025-08-06 DIAGNOSIS — E16.2 HYPOGLYCEMIA: ICD-10-CM

## 2025-08-06 DIAGNOSIS — Z12.5 SCREENING FOR PROSTATE CANCER: ICD-10-CM

## 2025-08-06 PROCEDURE — 3077F SYST BP >= 140 MM HG: CPT | Performed by: FAMILY MEDICINE

## 2025-08-06 PROCEDURE — 1036F TOBACCO NON-USER: CPT | Performed by: FAMILY MEDICINE

## 2025-08-06 PROCEDURE — 99214 OFFICE O/P EST MOD 30 MIN: CPT | Performed by: FAMILY MEDICINE

## 2025-08-06 PROCEDURE — 99396 PREV VISIT EST AGE 40-64: CPT | Performed by: FAMILY MEDICINE

## 2025-08-06 PROCEDURE — 3079F DIAST BP 80-89 MM HG: CPT | Performed by: FAMILY MEDICINE

## 2025-08-06 PROCEDURE — 3008F BODY MASS INDEX DOCD: CPT | Performed by: FAMILY MEDICINE

## 2025-08-06 RX ORDER — BENAZEPRIL HYDROCHLORIDE 40 MG/1
40 TABLET ORAL DAILY
Qty: 100 TABLET | Refills: 3 | Status: SHIPPED | OUTPATIENT
Start: 2025-08-06

## 2025-08-06 RX ORDER — BENAZEPRIL HYDROCHLORIDE 40 MG/1
40 TABLET ORAL DAILY
Qty: 100 TABLET | Refills: 1 | Status: SHIPPED | OUTPATIENT
Start: 2025-08-06 | End: 2025-08-06

## 2025-08-06 NOTE — ASSESSMENT & PLAN NOTE
55yM doing fairly well.  Orders:    Hepatitis C Antibody; Future    HIV 1/2 Antigen/Antibody Screen with Reflex to Confirmation; Future

## 2025-08-06 NOTE — PATIENT INSTRUCTIONS
"  Please return for a(n) blood pressure and medication follow-up appointment in 6 months, after tests to review results and options, earlier if any question or concern. Please return for your next Wellness visit in 12 months. Please schedule additional problem-focused appointment(s) to address additional problem(s). Simply mentioning or talking briefly about a problem or concern does not necessarily mean it is currently being addressed. Time constraints dictate that not every problem/concern can always be addressed.    Monitor your resting blood pressure (BP) and heart rate (HR) at home. Resting BP should be fairly consistently lower than 140/90, preferably better than 130/80. Systolic BP (the top number) should generally stay higher than 100. Normal HR range is , as low as 50 might be acceptable. Please bring your blood pressure machine to your appointments. For a list of validated BP machines: https://www.validatebp.org/  Recommended vaccines:  Influenza, annual  Prevnar-20 \"pneumonia\" vaccine  Shingrix (shingles) vaccine series  TDaP (tetanus-diphtheria-pertussis) vaccine  Avoid taking Biotin (a vitamin, shows up particularly in hair/nail supplements) for a week prior to any blood tests, as it can interfere with certain results. Fasting for labs means 12 hours, nothing to eat or drink, except water and medications, unless directed otherwise.    For assistance with scheduling referrals or consultations, please call [phone]. For laboratory, radiology, and other tests, please call [phone] ([phone] for pediatrics). Please review prescription inserts and published information for possible adverse effects of all medications. Return after testing or consultation to review results and recommendations, if symptoms persist, change, worsen, or return, or if you have any question or concern. If you do not get results within 7-10 days, or you have any question or concern, please send a message, call the " office ([phone]), or return to the office for a follow-up appointment. For non-emergencies, you may call the office. For emergencies, call 9-1-1 or go to the nearest Emergency Department. Please schedule additional appointment(s) to address concern(s) not addressed today. An annual Wellness visit is strongly recommended. A Wellness visit should be dedicated to addressing routine health maintenance matters (e.g., cancer screenings, cardiovascular screening, etc.). Problem-focused visits, typically prompted by symptoms or specific concerns, are usually conducted separately, particularly if multiple or complex problems need to be addressed.    In general, results are not released or discussed over the telephone, but at an appointment or via  [employer]. Results of tests done through [facility] are released via  [employer] (see below).  [email or web address]   [employer] support line: [phone]

## 2025-08-06 NOTE — PROGRESS NOTES
"Subjective   Patient ID: Estevan Hernandez is a 55 y.o. male who presents for Annual Exam (Physical. ).  HPI Historian(s): Self    Generally feeling well.    Reports home BP 120s-130s/80s.    Denies FMHx colon cancer, PMHx colon polyps, IBD, positive CRC screening test, bloody or black tarry stool, decreased caliber of the stool, diarrhea, constipation, abdominal pain, unintentional weight loss.     Review of Systems   All other systems reviewed and are negative.  CPE ROS negative.    No LMP for male patient.   High blood pressure    Tobacco Use History[1]  Social History     Substance and Sexual Activity   Alcohol Use Yes    Alcohol/week: 7.0 - 10.0 standard drinks of alcohol    Types: 7 - 10 Cans of beer per week    Comment: 7 -10 a week     Social History     Substance and Sexual Activity   Drug Use Never       Family History[2]    Patient Care Team:  Jay Gregg DO as PCP - General (Family Medicine)  Juliana Rico MD    RX Allergies[3]    Prior to Admission medications   Medication Sig Start Date End Date Taking? Authorizing Provider   benazepril (Lotensin) 40 mg tablet TAKE 1 TABLET BY MOUTH EVERY DAY 8/2/25   Jay Gregg DO   benazepril (Lotensin) 40 mg tablet TAKE 1 TABLET BY MOUTH EVERY DAY 1/29/25 8/2/25  Jay Gregg DO        Objective     Vitals:    08/06/25 1126   BP: 152/87   BP Location: Left arm   Patient Position: Sitting   Pulse: (!) 48   SpO2: 99%   Weight: 70.7 kg (155 lb 12.8 oz)   Height: 1.74 m (5' 8.5\")              Physical Exam  Vitals and nursing note reviewed.   Constitutional:       General: He is not in acute distress.     Appearance: Normal appearance. He is well-developed.      Comments: No assistive device presently being used.   HENT:      Head: Normocephalic and atraumatic.      Nose: Nose normal.     Eyes:      General: No scleral icterus.     Extraocular Movements: Extraocular movements intact.      Conjunctiva/sclera: Conjunctivae normal.     Neck: "      Thyroid: No thyromegaly.      Vascular: No carotid bruit or JVD.     Cardiovascular:      Rate and Rhythm: Normal rate and regular rhythm.      Heart sounds: Normal heart sounds.   Pulmonary:      Effort: Pulmonary effort is normal. No respiratory distress.      Breath sounds: Normal breath sounds.   Abdominal:      General: Bowel sounds are normal. There is no distension.      Palpations: Abdomen is soft. There is no mass.      Tenderness: There is no abdominal tenderness. There is no guarding or rebound.     Musculoskeletal:      Cervical back: Normal range of motion. No tenderness.      Right lower leg: No edema.      Left lower leg: No edema.     Skin:     General: Skin is warm and dry.      Coloration: Skin is not jaundiced.     Neurological:      General: No focal deficit present.      Mental Status: He is alert and oriented to person, place, and time. Mental status is at baseline.     Psychiatric:         Mood and Affect: Mood normal.         Behavior: Behavior normal.         Thought Content: Thought content normal.         Assessment & Plan  Well adult health check  55yM doing fairly well.  Orders:    Hepatitis C Antibody; Future    HIV 1/2 Antigen/Antibody Screen with Reflex to Confirmation; Future    Primary hypertension  Well controlled by home readings.  Orders:    Comprehensive Metabolic Panel; Future    benazepril (Lotensin) 40 mg tablet; Take 1 tablet (40 mg) by mouth once daily.    Hypoglycemia  Check A1c.  Orders:    Hemoglobin A1C; Future    Screening for prostate cancer    Orders:    Prostate Specific Antigen, Screen; Future    Screening for colon cancer  Discussed usual options; he would like to do colonoscopy.  Orders:    Colonoscopy Screening; Average Risk Patient; Future          Declines:  vaccine(s) (all)             [1]   Social History  Tobacco Use   Smoking Status Never   Smokeless Tobacco Never   [2]   Family History  Problem Relation Name Age of Onset    Hypertension Mother       Cirrhosis Father      Alcohol abuse Father      Diabetes type I Father      Other (colon issue) Mother's Brother          possibly colon cancer but not sure, no other FMHx colon cancer   [3] No Known Allergies

## 2025-08-06 NOTE — ASSESSMENT & PLAN NOTE
Well controlled by home readings.  Orders:    Comprehensive Metabolic Panel; Future    benazepril (Lotensin) 40 mg tablet; Take 1 tablet (40 mg) by mouth once daily.

## 2025-08-07 LAB
ALBUMIN SERPL-MCNC: 4.6 G/DL (ref 3.6–5.1)
ALP SERPL-CCNC: 67 U/L (ref 35–144)
ALT SERPL-CCNC: 17 U/L (ref 9–46)
ANION GAP SERPL CALCULATED.4IONS-SCNC: 9 MMOL/L (CALC) (ref 7–17)
AST SERPL-CCNC: 20 U/L (ref 10–35)
BILIRUB SERPL-MCNC: 0.5 MG/DL (ref 0.2–1.2)
BUN SERPL-MCNC: 23 MG/DL (ref 7–25)
CALCIUM SERPL-MCNC: 9.8 MG/DL (ref 8.6–10.3)
CHLORIDE SERPL-SCNC: 101 MMOL/L (ref 98–110)
CO2 SERPL-SCNC: 29 MMOL/L (ref 20–32)
CREAT SERPL-MCNC: 0.9 MG/DL (ref 0.7–1.3)
EGFRCR SERPLBLD CKD-EPI 2021: 100 ML/MIN/1.73M2
EST. AVERAGE GLUCOSE BLD GHB EST-MCNC: 103 MG/DL
EST. AVERAGE GLUCOSE BLD GHB EST-SCNC: 5.7 MMOL/L
GLUCOSE SERPL-MCNC: 83 MG/DL (ref 65–99)
HBA1C MFR BLD: 5.2 %
HCV AB SERPL QL IA: NORMAL
HIV 1+2 AB+HIV1 P24 AG SERPL QL IA: NORMAL
HIV 1+2 AB+HIV1 P24 AG SERPL QL IA: NORMAL
POTASSIUM SERPL-SCNC: 4.2 MMOL/L (ref 3.5–5.3)
PROT SERPL-MCNC: 6.7 G/DL (ref 6.1–8.1)
PSA SERPL-MCNC: 1.7 NG/ML
SODIUM SERPL-SCNC: 139 MMOL/L (ref 135–146)